# Patient Record
Sex: FEMALE | HISPANIC OR LATINO | Employment: PART TIME | URBAN - METROPOLITAN AREA
[De-identification: names, ages, dates, MRNs, and addresses within clinical notes are randomized per-mention and may not be internally consistent; named-entity substitution may affect disease eponyms.]

---
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
## 2024-04-11 ENCOUNTER — OCCUPATIONAL MEDICINE (OUTPATIENT)
Dept: URGENT CARE | Facility: PHYSICIAN GROUP | Age: 19
End: 2024-04-11
Payer: COMMERCIAL

## 2024-04-11 ENCOUNTER — APPOINTMENT (OUTPATIENT)
Dept: URGENT CARE | Facility: PHYSICIAN GROUP | Age: 19
End: 2024-04-11
Payer: COMMERCIAL

## 2024-04-11 ENCOUNTER — HOSPITAL ENCOUNTER (OUTPATIENT)
Dept: RADIOLOGY | Facility: MEDICAL CENTER | Age: 19
End: 2024-04-11
Attending: PHYSICIAN ASSISTANT
Payer: COMMERCIAL

## 2024-04-11 ENCOUNTER — NON-PROVIDER VISIT (OUTPATIENT)
Dept: URGENT CARE | Facility: PHYSICIAN GROUP | Age: 19
End: 2024-04-11
Payer: COMMERCIAL

## 2024-04-11 ENCOUNTER — NON-PROVIDER VISIT (OUTPATIENT)
Dept: OCCUPATIONAL MEDICINE | Facility: CLINIC | Age: 19
End: 2024-04-11
Payer: COMMERCIAL

## 2024-04-11 VITALS
WEIGHT: 103 LBS | BODY MASS INDEX: 20.22 KG/M2 | OXYGEN SATURATION: 100 % | DIASTOLIC BLOOD PRESSURE: 74 MMHG | SYSTOLIC BLOOD PRESSURE: 106 MMHG | RESPIRATION RATE: 16 BRPM | HEIGHT: 60 IN | TEMPERATURE: 97.7 F | HEART RATE: 86 BPM

## 2024-04-11 DIAGNOSIS — Z02.6 ENCOUNTER RELATED TO WORKER'S COMPENSATION CLAIM: Primary | ICD-10-CM

## 2024-04-11 DIAGNOSIS — S67.10XA CRUSHING INJURY OF FINGER OF RIGHT HAND: ICD-10-CM

## 2024-04-11 DIAGNOSIS — Z02.83 ENCOUNTER FOR DRUG SCREENING: ICD-10-CM

## 2024-04-11 DIAGNOSIS — Z02.1 PRE-EMPLOYMENT DRUG SCREENING: ICD-10-CM

## 2024-04-11 LAB
AMP AMPHETAMINE: NEGATIVE
BREATH ALCOHOL COMMENT: NORMAL
COC COCAINE: NEGATIVE
INT CON NEG: NEGATIVE
INT CON POS: POSITIVE
MET METHAMPHETAMINES: NEGATIVE
OPI OPIATES: NEGATIVE
PCP PHENCYCLIDINE: NEGATIVE
POC BREATHALIZER: 0 PERCENT (ref 0–0.01)
POC DRUG COMMENT 753798-OCCUPATIONAL HEALTH: POSITIVE
THC: POSITIVE

## 2024-04-11 PROCEDURE — 8911 PR MRO FEE: Performed by: NURSE PRACTITIONER

## 2024-04-11 PROCEDURE — 82075 ASSAY OF BREATH ETHANOL: CPT | Performed by: PHYSICIAN ASSISTANT

## 2024-04-11 PROCEDURE — 73140 X-RAY EXAM OF FINGER(S): CPT | Mod: RT

## 2024-04-11 PROCEDURE — 80305 DRUG TEST PRSMV DIR OPT OBS: CPT | Performed by: PHYSICIAN ASSISTANT

## 2024-04-11 PROCEDURE — 3078F DIAST BP <80 MM HG: CPT | Performed by: PHYSICIAN ASSISTANT

## 2024-04-11 PROCEDURE — 3074F SYST BP LT 130 MM HG: CPT | Performed by: PHYSICIAN ASSISTANT

## 2024-04-11 PROCEDURE — 99214 OFFICE O/P EST MOD 30 MIN: CPT | Performed by: PHYSICIAN ASSISTANT

## 2024-04-11 NOTE — LETTER
PHYSICIAN’S AND CHIROPRACTIC PHYSICIAN[facility]                       PROGRESS REPORT  CERTIFICATION OF DISABILITY Claim Number:        Social Security Number:     Patient’s Name:Roselia Morrow Date of Injury:  4/10/2024     Employer:  [employer] *** Name of [employer] (if applicable)   Patient’s Job Description/Occupation:  Shift Lead ***   Previous Injuries/Diseases/Surgeries Contributing to the Condition:      Diagnosis:  The encounter diagnosis was Crushing injury of finger of right hand.   Related to the Industrial Injury? Explain:  Yes ***   Objective Medical Findings:  Patient has slight subungual hematoma to the second and third digits of the right hand.  Neurovascular intact distally.  Less than 2 capillary refill.  Patient has limitations with flexion at the DIP of the third digit of the right hand.  Patient has full extension of the digits of the right hand.  Slight bony tenderness to palpation to the distal aspects of the second and third digits of the right hand.  No laceration noted to the area but there is some dried blood around the proximal aspect of the fingernail of the third digit of the right hand.  No open wound.    []  None - Discharged                         Stable     []  Yes     []  No                           Ratable     []  Yes     []  No   []  Generally Improved                        []  Condition Worsened                              []  Condition Same         May Have Suffered a Permanent Disability     []  Yes     []  No   Treatment Plan:       [] No Change in Therapy                    [] PT/OT Prescribed                   [] Medication May be Used While Working    [] Case Management                          [] PT/OT Discontinued  []  Consultation    [] Further Diagnostic Studies    []  Prescription(s)                        [] Released to FULL DUTY /No Restrictions on (Date):                                                                                            [] Certified TOTALLY TEMPORARILY DISABLED (Indicate Dates): From:                       To:                               [x] Released to RESTRICTED/Modified Duty on (Date): From:            4/11/24                  To:                    4/15/24                                               Restrictions Are:     []  Permanent[x]  Temporary   [] No Sitting                   []  No Standing          [x]  No Pulling             []  Other:                                              [] No Bending at Waist  []  No Stooping          [x]  No Lifting                                                                            [] No Carrying                []  No Walking           [x]  Lifting Restricted to (lbs.):< or = to 10 pounds  [x] No Pushing                 []  No Climbing         []  No Reaching Above Shoulders   Date of Next Visit: 4/15/2024 Date of this Exam:  4/11/2024 Physician/Chiropractic Physician Name: Cristofer Bess P.A.-C. Physician/Chiropractic Physician Signature:   Rony Schulte DO MPH   [id number] (Rev. 2/24)

## 2024-04-11 NOTE — LETTER
EMPLOYEE’S CLAIM FOR COMPENSATION/ REPORT OF INITIAL TREATMENT  FORM C-4  PLEASE TYPE OR PRINT    EMPLOYEE’S CLAIM - PROVIDE ALL INFORMATION REQUESTED   First Name                    APOLONIA Veloz Last Name  Cristhian Birthdate                    2005                Sex  []M  []F Claim Number (Insurer’s Use Only)     Home Address  [address]  Age  18 y.o. 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 (if applicable)  Closing Safe    How did this injury or occupational disease occur? (Be specific and answer in detail. Use additional sheet if necessary)  Closing the safe and my fingers got caught   If you believe that you have an occupational disease, when did you first have knowledge of the disability and its relationship to your employment?  n/a Witnesses to the Accident (if applicable)  none      Nature of Injury or Occupational Disease  Workers' Compensation  Part(s) of Body Injured or Affected  Finger (R)      I CERTIFY THAT THE ABOVE IS TRUE AND CORRECT TO T HE BEST OF MY KNOWLEDGE AND THAT I  HAVE PROVIDED THIS INFORMATION IN ORDER TO OBTAIN THE BENEFITS OF [place], OR CHAPTER 617 [place]).  I HEREBY AUTHORIZE ANY PHYSICIAN, CHIROPRACTOR, SURGEON, PRACTITIONER OR ANY OTHER PERSON, ANY HOSPITAL, INCLUDING [facility], ANY  MEDICAL SERVICE ORGANIZATION, ANY INSURANCE COMPANY, OR OTHER INSTITUTION OR ORGANIZATION TO RELEASE TO EACH OTHER, ANY MEDICAL OR OTHER INFORMATION, INCLUDING BENEFITS PAID OR PAYABLE, PERTINENT TO THIS INJURY OR DISEASE, EXCEPT INFORMATION RELATIVE TO DIAGNOSIS, TREATMENT AND/OR COUNSELING FOR AIDS, PSYCHOLOGICAL CONDITIONS, ALCOHOL OR CONTROLLED SUBSTANCES, FOR WHICH I MUST GIVE SPECIFIC AUTHORIZATION.  A PHOTOSTAT OF THIS AUTHORIZATION SHALL BE VALID AS THE ORIGINAL.     Date   Place Employee’s Original or  *Electronic Signature   THIS REPORT MUST BE COMPLETED AND MAILED WITHIN 3 WORKING DAYS OF TREATMENT   Place  [facility]    Name of Facility  [facility]   Date 4/11/2024 Diagnosis and Description of Injury or Occupational Disease  (S67.10XA) Crushing injury of finger of right hand  The encounter diagnosis was Crushing injury of finger of right hand. Is there evidence that the injured employee was under the influence of alcohol and/or another controlled substance at the time of accident?  []No  [] Yes (if yes, please explain)   Hour 6:58 PM  No   Treatment: Would suggest avoiding any repetitive grasping or gripping with the right hand.  Would recommend patient continue to wear the finger splint at all times until next follow-up.  She should especially wear at work.  Restrictions are for the right hand.  No indication for antibiotics.  Patient's tetanus is up-to-date as of 2021.  Concern of possible flexor tendon injury to the distal aspect of the third digit of the right hand.  Again recommend patient continue wearing the finger splint until  follow-up at her next visit.  If improvement with flexion then I do not believe she would need to see a specialist at that time but if she can still not flex at that time then I would recommend following up with hand specialist at that point.    Have you advised the patient to remain off work five days or more?   [] Yes Indicate dates: From   To    []No If no, is the injured employee capable of: [] full duty [] modified duty                                                             No  Yes  If modified duty, specify any limitations / restrictions:  Recommend wearing finger splint at all times while at work and avoid fine motor grasping or use of the right hand.                                                                                                                                                                                                                                                                                                                                                                                                               X-Ray Findings:   Comments:pending final results    From information given by the employee, together with medical evidence, can you directly connect this injury or occupational disease as job incurred?  []Yes   [] No Yes    Is additional medical care by a physician indicated? []Yes [] No  Yes    Do you know of any previous injury or disease contributing to this condition or occupational disease? []Yes [] No (Explain if yes)                          No   Date  4/11/2024 Print Health Care Provider’s Name  Saurabh Bess P.A.-C. I certify that the employer’s copy of  this form was delivered to the employer on:   Address  [address]  [facility] INSURER'S USE ONLY                       [facility]  [address] Provider’s Tax ID Number  [id number]   Telephone  Dept: [phone]    Health Care Provider’s Original or Electronic Signature  bárbara-SAURABH Tatum  PJARETT. Degree (MD,, DC,COURTNEY,MARILY)  {Provider Degrees:17351}  Choose (if applicable)      ORIGINAL - TREATING HEALTHCARE PROVIDER PAGE 2 - INSURER/TPA PAGE 3 - EMPLOYER PAGE 4 - EMPLOYEE             Form C-4 (rev.08/23)     BRIEF DESCRIPTION OF RIGHTS AND BENEFITS  (Pursuant to [place] 616[place].050)    Notice of Injury or Occupational Disease (Incident Report Form C-1): If an injury or occupational disease (OD) arises out of and in the  course of employment, you must provide written notice to your employer as soon as practicable, but no later than 7 days after the accident or  OD. Your employer shall maintain a sufficient supply of the required forms.    Employee’s Claim for Compensation/Report of Initial Treatment (Form C-4): If medical treatment is sought, the Form C-4 is available at  the place of initial treatment. A completed Form C-4 must be filed within 90 days after an accident or OD. The treating physician, chiropractic  physician, physician assistant or advanced practice nurse must, within 3 working days after treatment, complete and mail to the employer, the  employer's insurer and third-party , the Claim for Compensation.    Medical Treatment: If you require medical treatment for your on-the-job injury or OD, you may be required to select a physician or  chiropractic physician from a list provided by your workers’ compensation insurer, if it has contracted with an Organization for Managed Care  (MCO) or Preferred Provider Organization (PPO) or providers of health care. If your employer has not entered into a contract with an MCO or  PPO, you may select a physician or chiropractic physician from the Panel of Physicians and Chiropractic Physicians. Any medical costs related  to your industrial injury or OD will be paid by your insurer.    Temporary Total Disability (TTD): If your doctor has certified that you are unable to work for a period of at least 5 consecutive days, or 5  cumulative days in a  20-day period, or places restrictions on you that your employer does not accommodate, you may be entitled to TTD  compensation.    Temporary Partial Disability (TPD): If the wage you receive upon reemployment is less than the compensation for TTD to which you are  entitled, the insurer may be required to pay you TPD compensation to make up the difference. TPD can only be paid for a maximum of 24  months.    Permanent Partial Disability (PPD): When your medical condition is stable and there is an indication of a PPD as a result of your injury or  OD, within 30 days, your insurer must arrange for an evaluation by a rating physician or chiropractic physician to determine the degree of your  PPD. The amount of your PPD award depends on the date of injury, the results of the PPD evaluation, your age and wage.    Permanent Total Disability (PTD): If you are medically certified by a treating physician or chiropractic physician as permanently and totally  disabled and have been granted a PTD status by your insurer, you are entitled to receive monthly benefits not to exceed 66 2/3% of your  average monthly wage. The amount of your PTD payments is subject to reduction if you previously received a lump-sum PPD award.    Vocational Rehabilitation Services: You may be eligible for vocational rehabilitation services if you are unable to return to the job due to a  permanent physical impairment or permanent restrictions as a result of your injury or occupational disease.    Transportation and Per Bubba Reimbursement: You may be eligible for travel expenses and per bubba associated with medical treatment.    Reopening: You may be able to reopen your claim if your condition worsens after claim closure.    Appeal Process: If you disagree with a written determination issued by the insurer or the insurer does not respond to your request, you may  appeal to the Department of Administration, , by following the instructions  contained in your determination letter. You must  appeal the determination within 70 days from the date of the determination letter at [address], [address], [place], [place]  [address], or [address], [address], [place], [place] [address]. If you disagree with the  decision, you may appeal to the  Department of Administration, . You must file your appeal within 30 days from the date of the  decision letter  at [address], [address], [place], [place] [address], or [address], [address], [place], [place] [address]. If you  disagree with a decision of an , you may file a petition for judicial review with the District Court. You must do so within 30  days of the ’s decision. You may be represented by an  at your own expense, or you may contact the [facility] for possible  Representation.    [place]  for Injured Workers (NAIW): If you disagree with a  decision, you may request that NAIW represent you  without charge at an  Hearing. For information regarding denial of benefits, you may contact the [facility] at: [address], [address], [place] [address], [phone], or [address], [place] [address], [phone]    To File a Complaint with the Division: If you wish to file a complaint with the  of the Division of Industrial Relations (DIR),  please contact the Workers’ Compensation Section, [address] [place] [address] [address], [place] [address], telephone [phone], or  [address] [facility] [address] [facility] [address], [place], [place] [address], telephone [phone].    For AssistancewithWorkers’Compensation Issues: You may contact the [facility] Office for Consumer Health Assistance, [address] [place] [address], Toll Free 1-958.291.4275, Web  site:  https://adsd.[place].gov/Programs/TRISTEN/Office_for_Consumer_Health_Assistance_(OCHA)/ E-mail: tristen@[facility].nv.gov              __________________________________________________________________                                    _________________            Employee Name / Signature                                                                                                                            Date                                                                                                                                                                                                                              D-2 (rev. 02/24)

## 2024-04-12 NOTE — PROGRESS NOTES
Subjective     Roselia Morrow is a 18 y.o. female who presents with Finger Injury ((R) hand index, middle and ring finger, slammed fingers inside a safe at work )    Initial DOI 4/10/2024: Patient states that she was closing the safe and her middle and index finger of her right hand got caught.  The middle finger took the majority of the trauma.  She has had difficulty with flexing her middle finger at the end of the finger.  Slight bleeding from the area as well.     Occurred only affecting the distal aspect of the second third digits of the right hand.  HPI  Patient presents today for evaluation of work-related injury as described above.  Review of Systems   Musculoskeletal:         Crushing injury of right fingers     PMH: No pertinent past medical history to this problem  MEDS: Medications were reviewed in Epic  ALLERGIES: Allergies were reviewed in Epic  SOCHX: Works as a shift lead at [employer]  FH: No pertinent family history to this problem         Objective     /74 (BP Location: Left arm, Patient Position: Sitting, BP Cuff Size: Adult)   Pulse 86   Temp 36.5 °C (97.7 °F) (Temporal)   Resp 16   Ht 1.524 m (5')   Wt 46.7 kg (103 lb)   SpO2 100%   BMI 20.12 kg/m²      Physical Exam  Vitals and nursing note reviewed.   Constitutional:       General: She is not in acute distress.     Appearance: Normal appearance. She is well-developed. She is not ill-appearing or toxic-appearing.   HENT:      Head: Normocephalic and atraumatic.      Right Ear: Hearing normal.      Left Ear: Hearing normal.   Cardiovascular:      Rate and Rhythm: Normal rate.   Pulmonary:      Effort: Pulmonary effort is normal.   Musculoskeletal:      Comments: As described below.   Skin:     General: Skin is warm and dry.   Neurological:      Mental Status: She is alert.      Coordination: Coordination normal.   Psychiatric:         Mood and Affect: Mood normal.       Patient has slight subungual hematoma to the second  and third digits of the right hand.  Neurovascular intact distally.  Less than 2 capillary refill.  Patient has limitations with flexion at the DIP of the third digit of the right hand.  Patient has full extension of the digits of the right hand.  Slight bony tenderness to palpation to the distal aspects of the second and third digits of the right hand.  No laceration noted to the area but there is some dried blood around the proximal aspect of the fingernail of the third digit of the right hand.  No open wound.     DX FINGERS  pending       Assessment & Plan   1. Crushing injury of finger of right hand    - DX-FINGER(S) 2+ RIGHT; Future  - Referral to Hand Surgery    Would suggest avoiding any repetitive grasping or gripping with the right hand.  Would recommend patient continue to wear the finger splint at all times until next follow-up.  She should especially wear at work.  Restrictions are for the right hand.  No indication for antibiotics.  Patient's tetanus is up-to-date as of 2021.  Concern of possible flexor tendon injury to the distal aspect of the third digit of the right hand.  Again recommend patient continue wearing the finger splint until follow-up at her next visit.  If improvement with flexion then I do not believe she would need to see a specialist at that time but if she can still not flex at that time then I would recommend following up with hand specialist at that point.  Area was cleaned today and antibiotic ointment and bandage placed.    Following placement of finger splint patient is still neurovascular intact distally and feels comfortable with splint.    No signs of infection or any indication for oral antibiotics at this time.  Recommend keeping area clean and can place topical antibiotics to the area as we did today.          Please note that this dictation was created using voice recognition software. I have made every reasonable attempt to correct obvious errors, but I expect that there  may be errors of grammar and possibly content that I did not discover before finalizing the note.

## 2024-04-15 ENCOUNTER — OCCUPATIONAL MEDICINE (OUTPATIENT)
Dept: URGENT CARE | Facility: PHYSICIAN GROUP | Age: 19
End: 2024-04-15
Payer: COMMERCIAL

## 2024-04-15 VITALS
DIASTOLIC BLOOD PRESSURE: 68 MMHG | TEMPERATURE: 98.5 F | SYSTOLIC BLOOD PRESSURE: 112 MMHG | WEIGHT: 103.73 LBS | BODY MASS INDEX: 20.36 KG/M2 | HEIGHT: 60 IN | RESPIRATION RATE: 16 BRPM | OXYGEN SATURATION: 100 % | HEART RATE: 82 BPM

## 2024-04-15 DIAGNOSIS — S67.10XA CRUSHING INJURY OF FINGER OF RIGHT HAND: ICD-10-CM

## 2024-04-15 PROCEDURE — 99213 OFFICE O/P EST LOW 20 MIN: CPT

## 2024-04-15 PROCEDURE — 3078F DIAST BP <80 MM HG: CPT

## 2024-04-15 PROCEDURE — 3074F SYST BP LT 130 MM HG: CPT

## 2024-04-15 NOTE — LETTER
PHYSICIAN’S AND CHIROPRACTIC PHYSICIAN'S                       PROGRESS REPORT  CERTIFICATION OF DISABILITY Claim Number:        Social Security Number:     Patient’s Name:Roselia Morrow Date of Injury:  4/10/2024     Employer:  [employer]  Name of O (if applicable)   Patient’s Job Description/Occupation:  Shift Lead    Previous Injuries/Diseases/Surgeries Contributing to the Condition:  None.    Diagnosis:  The encounter diagnosis was Crushing injury of finger of right hand.   Related to the Industrial Injury? Explain:  Yes    Objective Medical Findings:  R hand: Reduced range of motion to flexion of distal phalanx of 3rd digit. Tenderness to palpation on dorsal surface of DIP joint. Bruising present at base of nailbed. Capillary refill present in less than 2 seconds.     []  None - Discharged                         Stable     [x]  Yes     []  No                           Ratable     []  Yes     []  No   []  Generally Improved                        []  Condition Worsened                              []  Condition Same         May Have Suffered a Permanent Disability     []  Yes     []  No   Treatment Plan: Medication  Comments:Tylenol and ibuprofen OTC as needed for pain     [x] No Change in Therapy                    [] PT/OT Prescribed                   [] Medication May be Used While Working    [] Case Management                          [] PT/OT Discontinued  []  Consultation    [] Further Diagnostic Studies    []  Prescription(s)                        [] Released to FULL DUTY /No Restrictions on (Date):                                                                                           [] Certified TOTALLY TEMPORARILY DISABLED (Indicate Dates): From:                       To:                               [x] Released to RESTRICTED/Modified Duty on (Date): From:   4/15/24     To:       4/20/24                              Restrictions Are:     []  Permanent[x]   Temporary   [] No Sitting                   []  No Standing          [x]  No Pulling             []  Other:                                              [] No Bending at Waist  []  No Stooping          [x]  No Lifting              With R hand                          [] No Carrying                []  No Walking           []  Lifting Restricted to (lbs.):  Comments:No lifting with R hand  [] No Pushing                 []  No Climbing         []  No Reaching Above Shoulders   Date of Next Visit: 4/20/2024 Date of this Exam:  4/15/2024 Physician/Chiropractic Physician Name: Lidia Hurtado PMarbinAMARY LOU. Physician/Chiropractic Physician Signature:   Rony Schulte DO MPH   D-39 (Rev. 2/24)

## 2024-04-15 NOTE — PROGRESS NOTES
"Subjective:     Roselia Morrow is a 18 y.o. female who presents for Finger Injury (Smashed her RT index, middle and ring finger against the metal safe at work DOI 4/10/24. RT middle finger took the most damage. She was using the safe as leverage to step up and the door closed on her fingers. Has limited mobility. 6 pain scale when resting. C/o throbbing in RT middle finger. )      \"Initial DOI 4/10/2024: Patient states that she was closing the safe and her middle and index finger of her right hand got caught.  The middle finger took the majority of the trauma.  She has had difficulty with flexing her middle finger at the end of the finger.  Slight bleeding from the area as well.      Occurred only affecting the distal aspect of the second third digits of the right hand.\"    FV 1 4/15/24: Patient has been wearing her finger brace as instructed. She is still experiencing some discomfort with movement of her right middle finger. Additionally, she has noticed worsening bruising and discoloration to her finger nail. She has been performing restricted duty at work and has been tolerating it well. She does not yet feel ready to perform her full job duties secondary to difficulty gripping objects with her right hand.       ROS    PMH:  has no past medical history on file.  MEDS:   Current Outpatient Medications:     Norgestim-Eth Estrad Triphasic (ORTHO TRI-CYCLEN LO) 0.18/0.215/0.25 MG-25 MCG Tab, Take 1 Tablet by mouth every day., Disp: 84 Tablet, Rfl: 3  ALLERGIES: No Known Allergies  SURGHX:   Past Surgical History:   Procedure Laterality Date    DENTAL EXTRACTION(S)  12/2020    wisdom teeth extraction    TONSILLECTOMY  3/23/2010    Performed by LORIN LÓPEZ at SURGERY SAME DAY [facility] ORS     SOCHX:  reports that she has never smoked. She has never used smokeless tobacco. She reports that she does not drink alcohol and does not use drugs.  FH: Family history was reviewed, no pertinent findings to report     " Objective:     /68 (BP Location: Left arm, Patient Position: Sitting, BP Cuff Size: Adult)   Pulse 82   Temp 36.9 °C (98.5 °F) (Temporal)   Resp 16   Ht 1.524 m (5') Comment: Pt reported  Wt 47 kg (103 lb 11.6 oz)   SpO2 100%   BMI 20.26 kg/m²     Constitutional: Patient is in no acute distress. Appears well-developed and well-nourished.   Cardiovascular: Normal rate.    Pulmonary/Chest: Effort normal. No respiratory distress.   Neurological: Patient is alert and oriented to person, place, and time.   Skin: Skin is warm and dry.   Psychiatric: Normal mood and affect. Behavior is normal.     R hand: Reduced range of motion to flexion of distal phalanx of 3rd digit. Tenderness to palpation on dorsal surface of DIP joint. Bruising present at base of nailbed. Capillary refill present in less than 2 seconds.     Assessment/Plan:       1. Crushing injury of finger of right hand    Released to Restricted Duty FROM 4/15/2024 TO 4/20/2024  -Continue to wear brace   -Rest and ice right middle finger  -Tylenol and ibuprofen OTC   -Will evaluate for improvement in range of motion at next visit with possible referral to occupational medicine if range of motion is not improving with reduced swelling         Differential diagnosis, natural history, supportive care, and indications for immediate follow-up discussed.    Approximately 25 minutes was spent in preparing for visit, obtaining history, exam and evaluation, patient counseling/education and post visit documentation/orders.

## 2024-04-20 ENCOUNTER — OCCUPATIONAL MEDICINE (OUTPATIENT)
Dept: URGENT CARE | Facility: PHYSICIAN GROUP | Age: 19
End: 2024-04-20
Payer: COMMERCIAL

## 2024-04-20 VITALS
TEMPERATURE: 97.9 F | OXYGEN SATURATION: 100 % | HEART RATE: 82 BPM | SYSTOLIC BLOOD PRESSURE: 108 MMHG | BODY MASS INDEX: 17.67 KG/M2 | RESPIRATION RATE: 18 BRPM | HEIGHT: 60 IN | WEIGHT: 90 LBS | DIASTOLIC BLOOD PRESSURE: 60 MMHG

## 2024-04-20 DIAGNOSIS — S67.10XA CRUSHING INJURY OF FINGER OF RIGHT HAND: ICD-10-CM

## 2024-04-20 DIAGNOSIS — R20.9 DISTURBANCE OF SKIN SENSATION: ICD-10-CM

## 2024-04-20 PROCEDURE — 3078F DIAST BP <80 MM HG: CPT | Performed by: NURSE PRACTITIONER

## 2024-04-20 PROCEDURE — 99213 OFFICE O/P EST LOW 20 MIN: CPT | Performed by: NURSE PRACTITIONER

## 2024-04-20 PROCEDURE — 3074F SYST BP LT 130 MM HG: CPT | Performed by: NURSE PRACTITIONER

## 2024-04-20 NOTE — LETTER
"                  PHYSICIAN’S AND CHIROPRACTIC PHYSICIAN'S                       PROGRESS REPORT  CERTIFICATION OF DISABILITY Claim Number:        Social Security Number:     Patient’s Name:Roselia Morrow Date of Injury:  4/10/2024     Employer:  [employer]  Name of [employer] (if applicable)   Patient’s Job Description/Occupation:  Shift Lead    Previous Injuries/Diseases/Surgeries Contributing to the Condition:      Diagnosis:  (S67.10XA) Crushing injury of finger of right hand  (R20.9) Disturbance of skin sensationDiagnoses of Crushing injury of finger of right hand and Disturbance of skin sensation were pertinent to this visit.   Related to the Industrial Injury? Yes   Roselia Morrow is a 18 y.o. female who presents for Finger Injury (Smashed her RT index, middle and ring finger against the metal safe at work DOI 4/10/24. RT middle finger took the most damage. She was using the safe as leverage to step up and the door closed on her fingers. Has limited mobility. 6 pain scale when resting. C/o throbbing in RT middle finger. )        \"Initial DOI 4/10/2024: Patient states that she was closing the safe and her middle and index finger of her right hand got caught.  The middle finger took the majority of the trauma.  She has had difficulty with flexing her middle finger at the end of the finger.  Slight bleeding from the area as well.     Occurred only affecting the distal aspect of the second third digits of the right hand.\"     FV 1 4/15/24: Patient has been wearing her finger brace as instructed. She is still experiencing some discomfort with movement of her right middle finger. Additionally, she has noticed worsening bruising and discoloration to her finger nail. She has been performing restricted duty at work and has been tolerating it well. She does not yet feel ready to perform her full job duties secondary to difficulty gripping objects with her right hand.    F/V#2 4/20/2024:  Patient " reports that she is improved with her ROM of her right middle finger. She does think she can return to full duty now.  She is concerned that she has decreased sensation of the right middle fingertip, as well as a pulling sensation that goes up her forearm with rotation of her right hand/wrist.       Objective Medical Findings: Right hand:  Patient is wearing her splint. She does have decreased sensation to the tip of her right middle finger. There is subungual hematoma.  She has full ROM with stiffness.  She does get a pulling sensation to her right forearm when she rotations her hand/wrist.  Capillary refill is intact at less than 2 seconds.      []  None - Discharged                         Stable     [x]  Yes     []  No                           Ratable     []  Yes     []  No   [x]  Generally Improved                        []  Condition Worsened                              []  Condition Same         May Have Suffered a Permanent Disability     []  Yes     []  No   Treatment Plan:   1.  Crushing injury of right middle finger  2.  Sensory disturbance, right middle finger  Transfer of care to Occupational Medicine  Referral to Hand Surgery already in process, placed 4/12/2024  Trial of full duty without restriction  Patient will return to clinic sooner if she is worse with trial of full duty     [] No Change in Therapy                    [] PT/OT Prescribed                   [] Medication May be Used While Working    [] Case Management                          [] PT/OT Discontinued  [x]  Consultation    [] Further Diagnostic Studies    []  Prescription(s) Consult with hand surgery                       [x] Released to FULL DUTY /No Restrictions on (Date):      4/20/2024 -               5/3/2024                                                                       [] Certified TOTALLY TEMPORARILY DISABLED (Indicate Dates): From:                       To:                               [] Released to  RESTRICTED/Modified Duty on (Date): From:                              To:                                                                   Restrictions Are:     []  Permanent[]  Temporary   [] No Sitting                   []  No Standing          []  No Pulling             []  Other:                                              [] No Bending at Waist  []  No Stooping          []  No Lifting                                                                            [] No Carrying                []  No Walking           []  Lifting Restricted to (lbs.):   [] No Pushing                 []  No Climbing         []  No Reaching Above Shoulders   Date of Next Visit: 5/3/2024    Needs OCC follow up Date of this Exam:  4/20/2024 Physician/Chiropractic Physician Name: HUNTER Jaimes Physician/Chiropractic Physician Signature:   Rony Schulte DO MPH   [id number] (Rev. 2/24)

## 2024-04-20 NOTE — PROGRESS NOTES
"Subjective:     Roselia Morrow is a 18 y.o. female who presents for Other (X4/10/24.  follow up. R middle finger smashed in safe)      Roselia Morrow is a 18 y.o. female who presents for Finger Injury (Smashed her RT index, middle and ring finger against the metal safe at work DOI 4/10/24. RT middle finger took the most damage. She was using the safe as leverage to step up and the door closed on her fingers. Has limited mobility. 6 pain scale when resting. C/o throbbing in RT middle finger. )        \"Initial DOI 4/10/2024: Patient states that she was closing the safe and her middle and index finger of her right hand got caught.  The middle finger took the majority of the trauma.  She has had difficulty with flexing her middle finger at the end of the finger.  Slight bleeding from the area as well.      Occurred only affecting the distal aspect of the second third digits of the right hand.\"     FV 1 4/15/24: Patient has been wearing her finger brace as instructed. She is still experiencing some discomfort with movement of her right middle finger. Additionally, she has noticed worsening bruising and discoloration to her finger nail. She has been performing restricted duty at work and has been tolerating it well. She does not yet feel ready to perform her full job duties secondary to difficulty gripping objects with her right hand.     F/V#3 4/20/2024:  Patient reports that she is improved with her ROM of her right middle finger. She does think she can return to full duty now.  She is concerned that she has decreased sensation of the right middle fingertip, as well as a pulling sensation that goes up her forearm with rotation of her right hand/wrist.      PMH:   No pertinent past medical history to this problem  MEDS:  Medications were reviewed in EMR  ALLERGIES:  Allergies were reviewed in EMR  [employer]:  Works as a shift lead  FH:   No pertinent family history to this problem       Objective:     BP " 108/60   Pulse 82   Temp 36.6 °C (97.9 °F) (Temporal)   Resp 18   Ht 1.524 m (5')   Wt 40.8 kg (90 lb)   SpO2 100%   BMI 17.58 kg/m²     Right hand:  Patient is wearing her splint. She does have decreased sensation to the tip of her right middle finger. There is subungual hematoma.  She has full ROM with stiffness.  She does get a pulling sensation to her right forearm when she rotations her hand/wrist.  Capillary refill is intact at less than 2 seconds.      Assessment/Plan:       1. Crushing injury of finger of right hand  - Referral to Occupational Medicine    2. Disturbance of skin sensation  - Referral to Occupational Medicine    Released to Full Duty FROM 4/20/2024 TO 5/3/2024  1.  Crushing injury of right middle finger  2.  Sensory disturbance, right middle finger  Transfer of care to Occupational Medicine  Referral to Hand Surgery already in process, placed 4/12/2024  Trial of full duty without restriction  Patient will return to clinic sooner if she is worse with trial of full duty       Differential diagnosis, natural history, supportive care, and indications for immediate follow-up discussed.